# Patient Record
Sex: MALE | Race: OTHER | Employment: UNEMPLOYED | URBAN - METROPOLITAN AREA
[De-identification: names, ages, dates, MRNs, and addresses within clinical notes are randomized per-mention and may not be internally consistent; named-entity substitution may affect disease eponyms.]

---

## 2021-12-04 ENCOUNTER — HOSPITAL ENCOUNTER (EMERGENCY)
Age: 21
Discharge: HOME | End: 2021-12-04
Payer: COMMERCIAL

## 2021-12-04 VITALS
HEART RATE: 94 BPM | RESPIRATION RATE: 16 BRPM | SYSTOLIC BLOOD PRESSURE: 118 MMHG | TEMPERATURE: 97.4 F | DIASTOLIC BLOOD PRESSURE: 66 MMHG | OXYGEN SATURATION: 96 %

## 2021-12-04 DIAGNOSIS — J02.9 ACUTE PHARYNGITIS, UNSPECIFIED ETIOLOGY: Primary | ICD-10-CM

## 2021-12-04 DIAGNOSIS — H65.02 ACUTE SEROUS OTITIS MEDIA OF LEFT EAR, RECURRENCE NOT SPECIFIED: ICD-10-CM

## 2021-12-04 PROCEDURE — 6370000000 HC RX 637 (ALT 250 FOR IP): Performed by: EMERGENCY MEDICINE

## 2021-12-04 PROCEDURE — 99283 EMERGENCY DEPT VISIT LOW MDM: CPT

## 2021-12-04 RX ORDER — AMOXICILLIN AND CLAVULANATE POTASSIUM 875; 125 MG/1; MG/1
1 TABLET, FILM COATED ORAL ONCE
Status: COMPLETED | OUTPATIENT
Start: 2021-12-04 | End: 2021-12-04

## 2021-12-04 RX ORDER — AMOXICILLIN AND CLAVULANATE POTASSIUM 875; 125 MG/1; MG/1
1 TABLET, FILM COATED ORAL 2 TIMES DAILY
Qty: 20 TABLET | Refills: 0 | Status: SHIPPED | OUTPATIENT
Start: 2021-12-04 | End: 2021-12-14

## 2021-12-04 RX ADMIN — AMOXICILLIN AND CLAVULANATE POTASSIUM 1 TABLET: 875; 125 TABLET, FILM COATED ORAL at 11:37

## 2021-12-04 ASSESSMENT — ENCOUNTER SYMPTOMS
VOMITING: 0
SHORTNESS OF BREATH: 0
COUGH: 0
DIARRHEA: 0
NAUSEA: 0
BACK PAIN: 0
SORE THROAT: 1
ABDOMINAL PAIN: 0

## 2021-12-04 ASSESSMENT — PAIN SCALES - GENERAL: PAINLEVEL_OUTOF10: 4

## 2021-12-04 ASSESSMENT — PAIN DESCRIPTION - PAIN TYPE: TYPE: ACUTE PAIN

## 2021-12-04 ASSESSMENT — PAIN DESCRIPTION - LOCATION: LOCATION: THROAT;EAR

## 2021-12-04 ASSESSMENT — PAIN DESCRIPTION - ORIENTATION: ORIENTATION: RIGHT;LEFT

## 2021-12-04 NOTE — ED TRIAGE NOTES
To ED with c/o sore throat, bilat ear pain, left >Right since monday. No cough or SOB. Skin warm and dry. Afebrile.

## 2021-12-04 NOTE — ED PROVIDER NOTES
[facility] [facility] ED  eMERGENCYdEPARTMENT eNCOUnter      Pt Name: Clinton Padilla  MRN: [id number]  [place] 2000  Date of evaluation: 12/4/2021  Debbie Munson MD    CHIEF COMPLAINT           HPI  Clinton Padilla is a 22 y.o. male per chart review has no pmh presents to the ED with R ear pain, sore throat since Tuesday. Pt notes gradual onset, moderate, constant, burning sore throat and R ear pain since Tuesday. +Fever. Pt denies cp, cough, ab pain, dysuria, diarrhea. ROS  Review of Systems   Constitutional: Negative for activity change, chills and fever. HENT: Positive for ear pain and sore throat. Eyes: Negative for visual disturbance. Respiratory: Negative for cough and shortness of breath. Cardiovascular: Negative for chest pain, palpitations and leg swelling. Gastrointestinal: Negative for abdominal pain, diarrhea, nausea and vomiting. Genitourinary: Negative for dysuria. Musculoskeletal: Negative for back pain. Skin: Negative for rash. Neurological: Negative for dizziness and weakness. Except as noted above the remainder of the review of systems was reviewed and negative. PAST MEDICAL HISTORY   No past medical history on file. SURGICAL HISTORY     No past surgical history on file. CURRENTMEDICATIONS       Previous Medications    No medications on file       ALLERGIES     Patient has no allergy information on record. FAMILY HISTORY     No family history on file.        SOCIAL HISTORY       Social History     Socioeconomic History    Marital status: Not on file     Spouse name: Not on file    Number of children: Not on file    Years of education: Not on file    Highest education level: Not on file   Occupational History    Not on file   Tobacco Use    Smoking status: Not on file    Smokeless tobacco: Not on file   Substance and Sexual Activity    Alcohol use: Not on file    Drug use: Not on file    Sexual activity: Not on file Other Topics Concern    Not on file   Social History Narrative    Not on file     Social Determinants of Health     Financial Resource Strain:     Difficulty of Paying Living Expenses: Not on file   Food Insecurity:     Worried About Running Out of Food in the Last Year: Not on file    Eryn of Food in the Last Year: Not on file   Transportation Needs:     Lack of Transportation (Medical): Not on file    Lack of Transportation (Non-Medical): Not on file   Physical Activity:     Days of Exercise per Week: Not on file    Minutes of Exercise per Session: Not on file   Stress:     Feeling of Stress : Not on file   Social Connections:     Frequency of Communication with Friends and Family: Not on file    Frequency of Social Gatherings with Friends and Family: Not on file    Attends Buddhism Services: Not on file    Active Member of [address] [place] [address] [employer] or Organizations: Not on file    Attends Club or Organization Meetings: Not on file    Marital Status: Not on file   Intimate Partner Violence:     Fear of Current or Ex-Partner: Not on file    Emotionally Abused: Not on file    Physically Abused: Not on file    Sexually Abused: Not on file   Housing Stability:     Unable to Pay for Housing in the Last Year: Not on file    Number of [place] in the Last Year: Not on file    Unstable Housing in the Last Year: Not on file         PHYSICAL EXAM       ED Triage Vitals   BP Temp Temp src Pulse Resp SpO2 Height Weight   -- -- -- -- -- -- -- --       Physical Exam  Vitals and nursing note reviewed. Constitutional:       Appearance: He is well-developed. HENT:      Head: Normocephalic. Right Ear: External ear normal.      Left Ear: External ear normal.      Ears:      Comments: L TM erythematic, opaque, poor landmarks. R TM opaque, poor landmarks     Mouth/Throat:      Mouth: Mucous membranes are moist.      Pharynx: Oropharyngeal exudate and posterior oropharyngeal erythema present.    Eyes: Conjunctiva/sclera: Conjunctivae normal.      Pupils: Pupils are equal, round, and reactive to light. Cardiovascular:      Rate and Rhythm: Normal rate and regular rhythm. Heart sounds: Normal heart sounds. Pulmonary:      Effort: Pulmonary effort is normal.      Breath sounds: Normal breath sounds. Abdominal:      General: Bowel sounds are normal. There is no distension. Palpations: Abdomen is soft. Tenderness: There is no abdominal tenderness. Musculoskeletal:         General: Normal range of motion. Cervical back: Normal range of motion and neck supple. Skin:     General: Skin is warm and dry. Neurological:      Mental Status: He is alert and oriented to person, place, and time. Psychiatric:         Mood and Affect: Mood normal.           MDM  23 yo male presents to the ED with L ear pain and sore throat. Pt is afebrile, hemodynamically stable. Pt clinically with otitis media. Pt given PO augmentin in the ED. Pt given ear infx warning signs, prescription for augmentin, and will f/u with pcp. Pt understands plan. FINAL IMPRESSION      1. Acute pharyngitis, unspecified etiology    2.  Acute serous otitis media of left ear, recurrence not specified          DISPOSITION/PLAN   DISPOSITION Decision To Discharge 12/04/2021 11:31:45 AM        DISCHARGE MEDICATIONS:  [unfilled]         Adelaida Snyder MD(electronically signed)  Attending Emergency Physician            Adelaida Snyder MD  12/04/21 2010

## 2023-02-02 ENCOUNTER — HOSPITAL ENCOUNTER (EMERGENCY)
Age: 23
Discharge: HOME | End: 2023-02-02
Payer: COMMERCIAL

## 2023-02-02 VITALS
SYSTOLIC BLOOD PRESSURE: 122 MMHG | BODY MASS INDEX: 21.99 KG/M2 | HEIGHT: 65 IN | WEIGHT: 132 LBS | RESPIRATION RATE: 18 BRPM | TEMPERATURE: 98.2 F | DIASTOLIC BLOOD PRESSURE: 76 MMHG | OXYGEN SATURATION: 100 % | HEART RATE: 61 BPM

## 2023-02-02 DIAGNOSIS — M67.40 GANGLION CYST: Primary | ICD-10-CM

## 2023-02-02 PROCEDURE — 99282 EMERGENCY DEPT VISIT SF MDM: CPT

## 2023-02-02 ASSESSMENT — ENCOUNTER SYMPTOMS
BACK PAIN: 0
VOMITING: 0
PHOTOPHOBIA: 0
NAUSEA: 0
EYE PAIN: 0
SHORTNESS OF BREATH: 0
COUGH: 0
SORE THROAT: 0
DIARRHEA: 0
ABDOMINAL PAIN: 0
RHINORRHEA: 0

## 2023-02-02 ASSESSMENT — PAIN - FUNCTIONAL ASSESSMENT: PAIN_FUNCTIONAL_ASSESSMENT: NONE - DENIES PAIN

## 2023-02-02 NOTE — ED TRIAGE NOTES
Pt to ED due to cyst on left hand. Pt states it has been there for a few months. Pt denies any pain or injury at this time. Pt is alert and oriented x4. Skin is warm, dry, intact.  Resp are regular and equal.

## 2023-02-02 NOTE — Clinical Note
Tatum Valdez was seen and treated in our emergency department on 2/2/2023. He may return to work on 02/03/2023. If you have any questions or concerns, please don't hesitate to call.       Trisha Austin

## 2023-02-02 NOTE — ED PROVIDER NOTES
[address] [facility] ED  EMERGENCY DEPARTMENT ENCOUNTER      Pt Name: Chun Fonseca  MRN: [id number]  [place] 2000  Date of evaluation: 2/2/2023  Provider: James Burgess PA-C      HISTORY OF PRESENT ILLNESS    Chun Fonseca is a 25 y.o. male who presents to the Emergency Department with cyst to left wrist for 4 months. It does not cause him pain, denies injury. Uses his hands for work. Denies numbness or tingling. REVIEW OF SYSTEMS       Review of Systems   Constitutional:  Negative for chills, diaphoresis, fatigue and fever. HENT:  Negative for congestion, rhinorrhea and sore throat. Eyes:  Negative for photophobia and pain. Respiratory:  Negative for cough and shortness of breath. Cardiovascular:  Negative for chest pain and palpitations. Gastrointestinal:  Negative for abdominal pain, diarrhea, nausea and vomiting. Genitourinary:  Negative for dysuria and flank pain. Musculoskeletal:  Negative for back pain. Skin:  Negative for rash. Neurological:  Negative for dizziness, light-headedness and headaches. Psychiatric/Behavioral: Negative. All other systems reviewed and are negative. PAST MEDICAL HISTORY   History reviewed. No pertinent past medical history. SURGICAL HISTORY     History reviewed. No pertinent surgical history. CURRENT MEDICATIONS       Previous Medications    No medications on file       ALLERGIES     Patient has no known allergies. FAMILY HISTORY     History reviewed. No pertinent family history.        SOCIAL HISTORY       Social History     Socioeconomic History    Marital status: Single     Spouse name: None    Number of children: None    Years of education: None    Highest education level: None   Tobacco Use    Smoking status: Never    Smokeless tobacco: Never   Substance and Sexual Activity    Alcohol use: Not Currently    Drug use: Never       SCREENINGS    [place] Coma Scale  Eye Opening: Spontaneous  Best Verbal Response: Oriented  Best Motor Response: Obeys commands  [place] Coma Scale Score: 15        PHYSICAL EXAM    (up to 7 for level 4, 8 or more for level 5)     ED Triage Vitals [02/02/23 0654]   BP Temp Temp src Heart Rate Resp SpO2 Height Weight   122/76 98.2 °F (36.8 °C) -- 61 18 100 % 5' 5\" (1.651 m) 132 lb (59.9 kg)       Physical Exam  Vitals and nursing note reviewed. Constitutional:       General: He is not in acute distress. Appearance: Normal appearance. He is well-developed. He is not diaphoretic. HENT:      Head: Normocephalic and atraumatic. Eyes:      General: Lids are normal.      Conjunctiva/sclera: Conjunctivae normal.   Cardiovascular:      Rate and Rhythm: Normal rate and regular rhythm. Pulses: Normal pulses. Heart sounds: Normal heart sounds. Pulmonary:      Effort: Pulmonary effort is normal.      Breath sounds: Normal breath sounds. Abdominal:      General: Bowel sounds are normal.      Palpations: Abdomen is soft. Tenderness: There is no abdominal tenderness. Musculoskeletal:      Cervical back: Normal range of motion and neck supple. Comments: Left wrist radial aspect has small ganglion cyst. No redness free mobile and rubbery. From. silt   Lymphadenopathy:      Cervical: No cervical adenopathy. Skin:     General: Skin is warm and dry. Capillary Refill: Capillary refill takes less than 2 seconds. Findings: No rash. Neurological:      Mental Status: He is alert and oriented to person, place, and time. Psychiatric:         Thought Content: Thought content normal.         Judgment: Judgment normal.         All other labs were within normal range or not returned as of this dictation.     EMERGENCY DEPARTMENT COURSE and DIFFERENTIALDIAGNOSIS/MDM:   Vitals:    Vitals:    02/02/23 0654   BP: 122/76   Pulse: 61   Resp: 18   Temp: 98.2 °F (36.8 °C)   SpO2: 100%   Weight: 132 lb (59.9 kg)   Height: 5' 5\" (1.651 m)            Pt has clnically what appears to be ganglion cyst. It is not bothering him aside from it just being there. Given referrel for ortho hand. Pt educated on diagnosis. PROCEDURES:  Unless otherwise noted below, none     Procedures      FINAL IMPRESSION      1.  Ganglion cyst          DISPOSITION/PLAN   DISPOSITION Decision To Discharge 02/02/2023 07:22:58 AM          Freedom Rodriguez (electronically signed)  Attending Emergency Physician       Sarah Chung PA-C  02/02/23 7601

## 2023-02-08 ENCOUNTER — OFFICE VISIT (OUTPATIENT)
Dept: ORTHOPEDIC SURGERY | Age: 23
End: 2023-02-08
Payer: COMMERCIAL

## 2023-02-08 VITALS
WEIGHT: 132 LBS | HEART RATE: 88 BPM | OXYGEN SATURATION: 98 % | HEIGHT: 65 IN | BODY MASS INDEX: 21.99 KG/M2 | TEMPERATURE: 98.3 F

## 2023-02-08 DIAGNOSIS — Z76.89 ENCOUNTER TO ESTABLISH CARE: Primary | ICD-10-CM

## 2023-02-08 PROCEDURE — 99204 OFFICE O/P NEW MOD 45 MIN: CPT | Performed by: ORTHOPAEDIC SURGERY

## 2023-02-08 NOTE — PROGRESS NOTES
Subjective:      Patient ID: Marie Patel is a 25 y.o. male who presents today for:  Chief Complaint   Patient presents with    Follow-up     Pt is here for ED Follow up . Ganglion cyst started appearing at work. Pt has no pain or restricted movement       HPI    He comes with a mass on his left hand has had it for a while its been bothering him it bothers him when he uses his hand or he bumps it and therefore he would like to have it removed. He is here with his significant other. No x-rays are taken. No past medical history on file. No past surgical history on file. Social History     Socioeconomic History    Marital status: Single     Spouse name: Not on file    Number of children: Not on file    Years of education: Not on file    Highest education level: Not on file   Occupational History    Not on file   Tobacco Use    Smoking status: Never    Smokeless tobacco: Never   Substance and Sexual Activity    Alcohol use: Not Currently    Drug use: Never    Sexual activity: Not on file   Other Topics Concern    Not on file   Social History Narrative    Not on file     Social Determinants of Health     Financial Resource Strain: Not on file   Food Insecurity: Not on file   Transportation Needs: Not on file   Physical Activity: Not on file   Stress: Not on file   Social Connections: Not on file   Intimate Partner Violence: Not on file   Housing Stability: Not on file     No family history on file. No Known Allergies  No current outpatient medications on file prior to visit. No current facility-administered medications on file prior to visit. Review of Systems  Patient has no fever chills night sweats.     Objective:   Pulse 88   Temp 98.3 °F (36.8 °C) (Temporal)   Ht 5' 5\" (1.651 m)   Wt 132 lb (59.9 kg)   SpO2 98%   BMI 21.97 kg/m²     ORTHOEXAM    Examination today is a negative Tinel over the area of the mass right over the [facility] joint however he has a negative grind test he has good cap refill and color his FPL and EPL are intact. Assessment:       Diagnosis Orders   1. Encounter to establish care  Ambulatory referral to [facility]            Plan:   The mass does not seem to be associated with the joint or the tendon and therefore options include observation or operative excision of the operative excision remove the mass. We can do this under local anesthetic. His recovery. Will be about 2 weeks. We discussed the surgery discussed the risk and benefits we discussed the immobilization rehab and how it affects his work. Therefore he like to proceed with this under local anesthetic after discussion. This will be arranged for an outpatient basis in 2 weeks time which is my next operative date. He is comfortable with that plan. He has no further questions. We have done today's visit through an . Orders Placed This Encounter   Procedures    Ambulatory referral to Family Practice     Referral Priority:   Routine     Referral Type:   Consult for Advice and Opinion     Referred to Provider:   Debra Martinez MD     Requested Specialty:   Internal Medicine     Number of Visits Requested:   1     No orders of the defined types were placed in this encounter. No follow-ups on file.       Tory Hayes MD       Surgery Phone: [address] [facility]   Surgery Fax: [phone]    Phone: [phone]          Fax: [phone]: Surgery Scheduling, PAT & PRE-OP Order Form  Call to advance Soso at [phone] at least 24 hours prior to date of service     Surgery Location: [facility] Surgery: [address], [address] [facility]  Oliver Chamorro MD Surgery Date:   Time: tf   Patient's Name: [place] : 2000    Gender: male   Home Phone:  [phone] Cell Phone: [phone]    SS#:  [id number]  Emergency Contact:  *No Contact Specified*   Phone:   Payor: Olivia Langston /  /  /    ID No.: [id number]      PROVIDER TO COMPLETE:  Diagnosis: Left hand mass  Procedure: Left deep mass excision hand  ICD-10 Code: _______________________  CPT Codes: 83768  Case Comments/Implants: N/A   Surgery Scheduled as: Outpatient  Anesthesia Requested: Local Only  Referring Family Doctor: No primary care provider on file. PAT  [x] [facility] PAT Date/Time:                                                            [x] History & Physical [] Physician will Provide [] Attached [] Dictated [] Other  [x] Follow Anesthesia Pre-Op Orders for X-rays, [facility]     [x] SN & PT to evaluate and treat/educate disease management, medications, home safety & equipment needs for total joint patients  [] Other: ____________________________________________________  Consults: Medical/Cardiac Clearance done by  ____________________  PRE-OP ORDERS:   Allergies: Patient has no known allergies.  Latex Allergies:             Diabetic:           [] IV ________________________  [x] IV Start with J-loop     Preprinted Orders: Attached [] Yes [] No   ANTIBIOTIC PRE-OP: [x] ANCEF 2 gram IVPB if > 120 kg 3 grams IVPB within 1 hour of incision, if ALLERGIC, use VANCOMYCIN 1 gram IV, 2 hours pre-op  [] TXA Protocol [] Other:   [x] NPO   [] Betablocker (if needed) _____________________________________   [] Knee high anti-embolic hose [] Thigh high anti-embolic hose   Other: ______________________________________________________    Physician Signature Required: emergent status Electronically signed by Izabel Pereira MD on 2/8/2023 at 3:22 PM  Date/Time: 2/8/2023

## 2023-02-21 RX ORDER — CEFAZOLIN SODIUM IN 0.9 % NACL 2 G/100 ML
2000 PLASTIC BAG, INJECTION (ML) INTRAVENOUS
Status: CANCELLED | OUTPATIENT
Start: 2023-02-22 | End: 2023-02-23

## 2023-02-22 ENCOUNTER — HOSPITAL ENCOUNTER (OUTPATIENT)
Age: 23
Setting detail: OUTPATIENT SURGERY
Discharge: HOME | End: 2023-02-22
Attending: ORTHOPAEDIC SURGERY | Admitting: ORTHOPAEDIC SURGERY
Payer: COMMERCIAL

## 2023-02-22 VITALS
WEIGHT: 132 LBS | TEMPERATURE: 97.9 F | SYSTOLIC BLOOD PRESSURE: 121 MMHG | RESPIRATION RATE: 18 BRPM | HEART RATE: 53 BPM | OXYGEN SATURATION: 100 % | DIASTOLIC BLOOD PRESSURE: 72 MMHG | BODY MASS INDEX: 21.99 KG/M2 | HEIGHT: 65 IN

## 2023-02-22 DIAGNOSIS — R22.30 MASS OF HAND, UNSPECIFIED LATERALITY: Primary | ICD-10-CM

## 2023-02-22 PROCEDURE — 2709999900 HC NON-CHARGEABLE SUPPLY: Performed by: ORTHOPAEDIC SURGERY

## 2023-02-22 PROCEDURE — 2580000003 HC RX 258: Performed by: ORTHOPAEDIC SURGERY

## 2023-02-22 PROCEDURE — 3600000003 HC SURGERY LEVEL 3 BASE: Performed by: ORTHOPAEDIC SURGERY

## 2023-02-22 PROCEDURE — 3600000013 HC SURGERY LEVEL 3 ADDTL 15MIN: Performed by: ORTHOPAEDIC SURGERY

## 2023-02-22 PROCEDURE — 2500000003 HC RX 250 WO HCPCS: Performed by: ORTHOPAEDIC SURGERY

## 2023-02-22 PROCEDURE — 7100000010 HC PHASE II RECOVERY - FIRST 15 MIN: Performed by: ORTHOPAEDIC SURGERY

## 2023-02-22 PROCEDURE — A4217 STERILE WATER/SALINE, 500 ML: HCPCS | Performed by: ORTHOPAEDIC SURGERY

## 2023-02-22 RX ORDER — SODIUM CHLORIDE 0.9 % (FLUSH) 0.9 %
5-40 SYRINGE (ML) INJECTION PRN
Status: CANCELLED | OUTPATIENT
Start: 2023-02-22

## 2023-02-22 RX ORDER — OXYCODONE HYDROCHLORIDE 5 MG/1
5 TABLET ORAL EVERY 4 HOURS PRN
Status: CANCELLED | OUTPATIENT
Start: 2023-02-22

## 2023-02-22 RX ORDER — BUPIVACAINE HYDROCHLORIDE 5 MG/ML
INJECTION, SOLUTION EPIDURAL; INTRACAUDAL PRN
Status: DISCONTINUED | OUTPATIENT
Start: 2023-02-22 | End: 2023-02-22 | Stop reason: ALTCHOICE

## 2023-02-22 RX ORDER — SODIUM CHLORIDE 9 MG/ML
INJECTION, SOLUTION INTRAVENOUS PRN
Status: CANCELLED | OUTPATIENT
Start: 2023-02-22

## 2023-02-22 RX ORDER — SODIUM CHLORIDE 0.9 % (FLUSH) 0.9 %
5-40 SYRINGE (ML) INJECTION EVERY 12 HOURS SCHEDULED
Status: CANCELLED | OUTPATIENT
Start: 2023-02-22

## 2023-02-22 RX ORDER — MORPHINE SULFATE 4 MG/ML
4 INJECTION, SOLUTION INTRAMUSCULAR; INTRAVENOUS
Status: CANCELLED | OUTPATIENT
Start: 2023-02-22

## 2023-02-22 RX ORDER — MORPHINE SULFATE 2 MG/ML
2 INJECTION, SOLUTION INTRAMUSCULAR; INTRAVENOUS
Status: CANCELLED | OUTPATIENT
Start: 2023-02-22

## 2023-02-22 RX ORDER — MAGNESIUM HYDROXIDE 1200 MG/15ML
LIQUID ORAL CONTINUOUS PRN
Status: COMPLETED | OUTPATIENT
Start: 2023-02-22 | End: 2023-02-22

## 2023-02-22 RX ORDER — SODIUM CHLORIDE 9 MG/ML
50 INJECTION, SOLUTION INTRAVENOUS CONTINUOUS
Status: CANCELLED | OUTPATIENT
Start: 2023-02-22 | End: 2023-02-22

## 2023-02-22 RX ORDER — MAGNESIUM HYDROXIDE 1200 MG/15ML
LIQUID ORAL PRN
Status: DISCONTINUED | OUTPATIENT
Start: 2023-02-22 | End: 2023-02-22 | Stop reason: ALTCHOICE

## 2023-02-22 RX ORDER — HYDROCODONE BITARTRATE AND ACETAMINOPHEN 5; 325 MG/1; MG/1
1 TABLET ORAL EVERY 8 HOURS PRN
Qty: 5 TABLET | Refills: 0 | Status: SHIPPED | OUTPATIENT
Start: 2023-02-22 | End: 2023-02-25

## 2023-02-22 ASSESSMENT — PAIN SCALES - GENERAL
PAINLEVEL_OUTOF10: 0

## 2023-02-22 ASSESSMENT — PAIN - FUNCTIONAL ASSESSMENT: PAIN_FUNCTIONAL_ASSESSMENT: NONE - DENIES PAIN

## 2023-02-22 NOTE — OP NOTE
Operative Note      Patient: Colleen Bucio  YOB: 2000  MRN: [id number]    Date of Procedure: 2/22/2023    Pre-Op Diagnosis: LEFT HAND MASS    Post-Op Diagnosis: Same       Procedure(s):  LEFT DEEP MASS EXCISION HAND. Surgeon(s):  Fabricio Shaw MD    Assistant:   First Assistant: [address]    Anesthesia: Local    Estimated Blood Loss (mL): Minimal    Complications: None    Specimens:   * No specimens in log *    Implants:  * No implants in log *      Drains: * No LDAs found *    Findings: Deep benign mass down to level of periosteum over first metacarpal at [facility] joint    Detailed Description of Procedure:     Brief clinical note: Debra Mendenhall The patient presents with a mass of the left hand. The area is extremely painful and bothersome to him. Is not associated with anything. The patient otherwise has good cap refill and color and no other issues. Risks and benefits of surgery discussed as well as continued nonoperative treatment and the patient wished to proceed operatively and was consented and marked. Operative note:    Patient taken the operative and and draped in usual manner. A final timeout is done with the operative team.  I done Esmarch exsanguination and then I have infiltrated some lidocaine into the incision area for preoperative anesthetic. Once is taken effect the is case is initiated. Incision is carried out longitudinally of the area. The flaps were meticulously elevated. Circumferential dissection is carried the mass. Fortunately goes down to the level of the tendons and the periosteum. The mass is removed off and appears to be consistent with a benign lesion. Therefore its not sent a specimen. The surrounding EPB and APL tendons are noted and minimal tenosynovectomy as per done there. Areas visualized for no other masses which are not appreciable or palpable. CMC joint is well located.   Skin is then approximated and closed in 2 layers with Monocryl and nylon.  Sings include Xeroform fluffs web roll and a well-padded thumb spica splint. Patient tolerated the procedure well without complication.     Electronically signed by Cb Burton MD on 2/22/2023 at 4:42 PM

## 2023-03-08 ENCOUNTER — OFFICE VISIT (OUTPATIENT)
Dept: ORTHOPEDIC SURGERY | Age: 23
End: 2023-03-08

## 2023-03-08 DIAGNOSIS — R22.32 MASS OF FINGER OF LEFT HAND: Primary | ICD-10-CM

## 2023-03-08 PROCEDURE — 99024 POSTOP FOLLOW-UP VISIT: CPT | Performed by: PHYSICIAN ASSISTANT

## 2023-03-08 NOTE — PROGRESS NOTES
[facility]      Subjective:      Chief Complaint   Patient presents with    Follow-up       HPI: Jammie De Dios is a 25 y.o. male who is here 2 weeks postop mass removal thumb left. No erythema, discharge, swelling at the incision site. Some residual stiffness appreciated. Pain well managed. No past medical history on file. Past Surgical History:   Procedure Laterality Date    HAND SURGERY Left 2/22/2023    LEFT DEEP MASS EXCISION HAND. [facility]     Socioeconomic History    Marital status: Single     Spouse name: Not on file    Number of children: Not on file    Years of education: Not on file    Highest education level: Not on file   Occupational History    Not on file   Tobacco Use    Smoking status: Never    Smokeless tobacco: Never   Substance and Sexual Activity    Alcohol use: Not Currently    Drug use: Never    Sexual activity: Not on file   Other Topics Concern    Not on file   Social History Narrative    Not on file     Social Determinants of Health     Financial Resource Strain: Not on file   Food Insecurity: Not on file   Transportation Needs: Not on file   Physical Activity: Not on file   Stress: Not on file   Social Connections: Not on file   Intimate Partner Violence: Not on file   Housing Stability: Not on file     No family history on file. No Known Allergies  No current outpatient medications on file prior to visit. No current facility-administered medications on file prior to visit. Objective: There were no vitals taken for this visit. General: WDWN, well appearing, in no distress   Skin: without breakdown, rash, normal in color    Well-healing incisions at the palmar aspect of the hand, no erythema or discharge or signs of infection.  Minimal stiffness in all motion is appreciated but yet anticipated after this surgery      Radiographs and Laboratory Studies: Laboratory Studies:   No results found for: WBC, HGB, HCT, MCV, PLT  No results found for: SEDRATE  No results found for: CRP    Assessment and Plan:      Diagnosis Orders   1. Mass of finger of left hand            Sutures were removed. Incision without erythema, discharge, swelling. Instructed to continue with range of motion exercises until any residual stiffness is resolved. If stiffness is still present in a week they were instructed to call our office and we will initiate therapy. No submerging incision under water for another week, however they can shower. In about 1 week they were instructed to massage the incision with vitamin E lotion to help with soreness and break up scar tissue. If they are having any issues or signs of infection, they were instructed to call our office immediately and make a follow-up appointment. Otherwise we will see them back in 6 weeks if needed      No orders of the defined types were placed in this encounter. No orders of the defined types were placed in this encounter. No follow-ups on file.     Marquis Garcia PA-C  [facility]  [phone]

## 2023-03-10 ENCOUNTER — TELEPHONE (OUTPATIENT)
Dept: ORTHOPEDIC SURGERY | Age: 23
End: 2023-03-10

## 2023-03-10 NOTE — TELEPHONE ENCOUNTER
Surgery Scheduling and Authorization Information    Surgery Date:02/22/2023  Surgery good through dates:   CPT Code(s) 11266  Procedure(s) (L) Deep Mass Excision  Hand      Approved [] Not Required [x] Denied []  Reference # [id number]  Auth #    How authorization obtained:  [] web portal             [x] via phone       [] Via fax    Provider  [] Karma Taylor  [x] Devante Aldana  [] Susanna Bellamy  [] Mallorie Young  [] Kin Cockayne  [] Hayley Denney  [] Dilcia Kuo  [] Keily Moore  [] Dee Newman      Surgery Sheet Faxed to Scheduling [x]  Surgery and Prior Authorization Information Sheet Scanned [x]

## 2024-08-27 ENCOUNTER — APPOINTMENT (OUTPATIENT)
Dept: GENERAL RADIOLOGY | Age: 24
End: 2024-08-27
Payer: COMMERCIAL

## 2024-08-27 ENCOUNTER — HOSPITAL ENCOUNTER (EMERGENCY)
Age: 24
Discharge: HOME | End: 2024-08-27
Payer: COMMERCIAL

## 2024-08-27 VITALS
OXYGEN SATURATION: 98 % | BODY MASS INDEX: 23.32 KG/M2 | WEIGHT: 140 LBS | HEART RATE: 76 BPM | DIASTOLIC BLOOD PRESSURE: 86 MMHG | TEMPERATURE: 98.6 F | SYSTOLIC BLOOD PRESSURE: 129 MMHG | RESPIRATION RATE: 18 BRPM | HEIGHT: 65 IN

## 2024-08-27 DIAGNOSIS — S61.217A LACERATION OF LEFT LITTLE FINGER WITHOUT FOREIGN BODY WITHOUT DAMAGE TO NAIL, INITIAL ENCOUNTER: Primary | ICD-10-CM

## 2024-08-27 PROCEDURE — 73130 X-RAY EXAM OF HAND: CPT

## 2024-08-27 PROCEDURE — 99284 EMERGENCY DEPT VISIT MOD MDM: CPT

## 2024-08-27 PROCEDURE — 6370000000 HC RX 637 (ALT 250 FOR IP)

## 2024-08-27 PROCEDURE — 6360000002 HC RX W HCPCS

## 2024-08-27 PROCEDURE — 12002 RPR S/N/AX/GEN/TRNK2.6-7.5CM: CPT

## 2024-08-27 PROCEDURE — 90715 TDAP VACCINE 7 YRS/> IM: CPT

## 2024-08-27 PROCEDURE — 90471 IMMUNIZATION ADMIN: CPT

## 2024-08-27 RX ORDER — BACITRACIN ZINC 500 [USP'U]/G
OINTMENT TOPICAL ONCE
Status: COMPLETED | OUTPATIENT
Start: 2024-08-27 | End: 2024-08-27

## 2024-08-27 RX ADMIN — TETANUS TOXOID, REDUCED DIPHTHERIA TOXOID AND ACELLULAR PERTUSSIS VACCINE, ADSORBED 0.5 ML: 5; 2.5; 8; 8; 2.5 SUSPENSION INTRAMUSCULAR at 16:56

## 2024-08-27 RX ADMIN — BACITRACIN ZINC: 500 OINTMENT TOPICAL at 16:58

## 2024-08-27 ASSESSMENT — PAIN SCALES - GENERAL: PAINLEVEL_OUTOF10: 5

## 2024-08-27 ASSESSMENT — PAIN DESCRIPTION - ORIENTATION: ORIENTATION: LEFT

## 2024-08-27 ASSESSMENT — ENCOUNTER SYMPTOMS
COUGH: 0
DIARRHEA: 0
SHORTNESS OF BREATH: 0
NAUSEA: 0
TROUBLE SWALLOWING: 0
VOMITING: 0
EYE PAIN: 0
APNEA: 0
ABDOMINAL PAIN: 0
COLOR CHANGE: 0
ALLERGIC/IMMUNOLOGIC NEGATIVE: 1

## 2024-08-27 ASSESSMENT — LIFESTYLE VARIABLES
HOW MANY STANDARD DRINKS CONTAINING ALCOHOL DO YOU HAVE ON A TYPICAL DAY: PATIENT DOES NOT DRINK
HOW OFTEN DO YOU HAVE A DRINK CONTAINING ALCOHOL: NEVER

## 2024-08-27 ASSESSMENT — PAIN DESCRIPTION - LOCATION: LOCATION: HAND

## 2024-08-27 ASSESSMENT — PAIN - FUNCTIONAL ASSESSMENT: PAIN_FUNCTIONAL_ASSESSMENT: 0-10

## 2024-08-27 NOTE — ED PROVIDER NOTES
findings:  Read By Myself:    Interpretation per the Radiologist below, if available at the time of this note:    XR HAND LEFT (MIN 3 VIEWS)   Final Result   Soft tissue injury to the 5th digit. No radiopaque foreign body identified in   the soft tissues.  No acute bony abnormality             LABS:  Labs Reviewed - No data to display    All other labs were within normal range or not returnedas of this dictation.    EMERGENCYDEPARTMENT COURSE and DIFFERENTIAL DIAGNOSIS/MDM:   Vitals:    Vitals:    08/27/24 1402   BP: 129/86   Pulse: 76   Resp: 18   Temp: 98.6 °F (37 °C)   TempSrc: Oral   SpO2: 98%   Weight: 63.5 kg (140 lb)   Height: 1.651 m (5' 5\")       REASSESSMENT        Patient presents to the emergency department toady with left left pinky finger.  Patient reports that he was washing dishes and did not notice a broken glass in his pinky on.  Patient came to the emergency department for repair.  Denies any numbness or tingling.  Denies any loss of function.  Denies any other complaints at this time.  Patient does not remember when his last tetanus vaccine was.  X-ray left hand obtained in the emergency department today shows soft tissue injury to the fifth digit, no radiopaque foreign body, and no acute bony abnormalities.  Discussed results with patient and that we will repair laceration with sutures.  Tetanus vaccine updated in the emergency department today.  Laceration was cleaned and repaired with sutures as noted below.  Bacitracin was applied to wound and wound was dressed in the emergency department today.  Finger splint was applied for suture protection.  Discussed wound care and suture care with patient.  Discussed signs of infection to monitor for.  Plan to discharge patient with instructions to have sutures removed by his PCP in 10 to 14 days.  Patient will return to the emergency department for any new or worsening symptoms.  Patient is afebrile, nontoxic, neurovascular intact, and hemodynamically  stable at time of discharge.  Patient expresses understanding and agreement with this plan.    Medical Decision Making  Amount and/or Complexity of Data Reviewed  Radiology: ordered.    Risk  OTC drugs.  Prescription drug management.       Coding     PROCEDURES:    Lac Repair    Date/Time: 8/27/2024 4:29 PM    Performed by: Luci Sevilla PA-C  Authorized by: Luci Sevilla PA-C    Consent:     Consent obtained:  Verbal    Consent given by:  Patient    Risks, benefits, and alternatives were discussed: yes      Risks discussed:  Infection, pain, poor cosmetic result, poor wound healing and need for additional repair    Alternatives discussed:  No treatment  Universal protocol:     Procedure explained and questions answered to patient or proxy's satisfaction: yes      Imaging studies available: yes      Patient identity confirmed:  Verbally with patient  Anesthesia:     Anesthesia method:  Local infiltration    Local anesthetic:  Lidocaine 1% w/o epi  Laceration details:     Location:  Finger    Finger location:  L small finger    Length (cm):  3    Depth (mm):  3  Pre-procedure details:     Preparation:  Patient was prepped and draped in usual sterile fashion and imaging obtained to evaluate for foreign bodies  Exploration:     Limited defect created (wound extended): no      Hemostasis achieved with:  Direct pressure    Imaging obtained: x-ray      Imaging outcome: foreign body not noted      Wound exploration: wound explored through full range of motion and entire depth of wound visualized      Contaminated: no    Treatment:     Area cleansed with:  Chlorhexidine and saline    Amount of cleaning:  Standard    Irrigation solution:  Sterile saline    Irrigation volume:  40 mL    Irrigation method:  Syringe    Visualized foreign bodies/material removed: no      Debridement:  None    Undermining:  None  Skin repair:     Repair method:  Sutures    Suture size:  4-0    Suture material:  Nylon    Suture technique:

## 2024-08-27 NOTE — ED TRIAGE NOTES
Patient sent from urgent car following hand lac to left hand on glass. States sent by urgent car because it was too deep for them to take it. Patients hand is wrapped in triage, not observed by this RN. States unsure if tetanus is utd

## 2024-08-27 NOTE — ED NOTES
Laceration to left hand 5th digit, without active drainage noted. Covered with DSD for XR. Tolerated well.

## 2024-08-27 NOTE — DISCHARGE INSTRUCTIONS
Keep wound dry for the first 24 to 48 hours, then clean wound 1-2 times per day with soap and water and use antibiotic ointment to prevent infections.  Wear splint on your finger to prevent your finger from bending which would open up the stitches.  If you have any pain, you can use Tylenol and Motrin.  Monitor for signs of infection as discussed.  Call to schedule a follow-up appointment with PCP for suture removal in 10 to 14 days as discussed.  Return to the emergency department for any new or worsening symptoms.

## 2024-08-27 NOTE — ED NOTES
Bacitracin ointment applied to left 5th digit, finger splint applied, covered with telfa and kerlix. Tolerated well.

## (undated) DEVICE — HYPODERMIC SAFETY NEEDLE: Brand: MAGELLAN

## (undated) DEVICE — SUTURE NONABSORBABLE MONOFILAMENT 5-0 PS-2 18 IN BLK ETHILON 1666H

## (undated) DEVICE — ZIMMER® STERILE DISPOSABLE TOURNIQUET CUFF, DUAL PORT, SINGLE BLADDER, 18 IN. (46 CM)

## (undated) DEVICE — DRESSING GZ W1XL8IN COT XRFRM N ADH OVERWRAP CURAD

## (undated) DEVICE — GAUZE,SPONGE,FLUFF,6"X6.75",STRL,10/TRAY: Brand: MEDLINE

## (undated) DEVICE — HAND II: Brand: MEDLINE INDUSTRIES, INC.

## (undated) DEVICE — BANDAGE COMPR W2INXL5YD WHT BGE POLY COT M E WRP WV HK AND

## (undated) DEVICE — COTTON UNDERCAST PADDING,REGULAR FINISH: Brand: WEBRIL

## (undated) DEVICE — 1010 S-DRAPE TOWEL DRAPE 10/BX: Brand: STERI-DRAPE™

## (undated) DEVICE — GOWN,AURORA,NONREINFORCED,LARGE: Brand: MEDLINE

## (undated) DEVICE — GLOVE ORANGE PI 8   MSG9080

## (undated) DEVICE — PADDING UNDERCAST W4INXL12FT RAYON POLY SYN NONADHESIVE